# Patient Record
Sex: FEMALE | HISPANIC OR LATINO | URBAN - METROPOLITAN AREA
[De-identification: names, ages, dates, MRNs, and addresses within clinical notes are randomized per-mention and may not be internally consistent; named-entity substitution may affect disease eponyms.]

---

## 2019-04-18 ENCOUNTER — APPOINTMENT (RX ONLY)
Dept: URBAN - METROPOLITAN AREA CLINIC 310 | Facility: CLINIC | Age: 44
Setting detail: DERMATOLOGY
End: 2019-04-18

## 2019-04-18 DIAGNOSIS — L20.89 OTHER ATOPIC DERMATITIS: ICD-10-CM

## 2019-04-18 PROBLEM — D56.9 THALASSEMIA, UNSPECIFIED: Status: ACTIVE | Noted: 2019-04-18

## 2019-04-18 PROBLEM — F41.9 ANXIETY DISORDER, UNSPECIFIED: Status: ACTIVE | Noted: 2019-04-18

## 2019-04-18 PROBLEM — L85.3 XEROSIS CUTIS: Status: ACTIVE | Noted: 2019-04-18

## 2019-04-18 PROBLEM — L29.8 OTHER PRURITUS: Status: ACTIVE | Noted: 2019-04-18

## 2019-04-18 PROBLEM — J30.1 ALLERGIC RHINITIS DUE TO POLLEN: Status: ACTIVE | Noted: 2019-04-18

## 2019-04-18 PROBLEM — R23.3 SPONTANEOUS ECCHYMOSES: Status: ACTIVE | Noted: 2019-04-18

## 2019-04-18 PROBLEM — L20.84 INTRINSIC (ALLERGIC) ECZEMA: Status: ACTIVE | Noted: 2019-04-18

## 2019-04-18 PROCEDURE — ? PRESCRIPTION

## 2019-04-18 PROCEDURE — 99202 OFFICE O/P NEW SF 15 MIN: CPT

## 2019-04-18 RX ORDER — TRIAMCINOLONE ACETONIDE 1 MG/G
OINTMENT TOPICAL
Qty: 1 | Refills: 2 | Status: ERX | COMMUNITY
Start: 2019-04-18

## 2019-04-18 RX ADMIN — TRIAMCINOLONE ACETONIDE: 1 OINTMENT TOPICAL at 16:27

## 2019-04-18 ASSESSMENT — LOCATION ZONE DERM: LOCATION ZONE: TRUNK

## 2019-04-18 ASSESSMENT — LOCATION SIMPLE DESCRIPTION DERM: LOCATION SIMPLE: CHEST

## 2019-04-18 ASSESSMENT — LOCATION DETAILED DESCRIPTION DERM: LOCATION DETAILED: STERNAL NOTCH

## 2019-04-18 NOTE — HPI: RASH
What Type Of Note Output Would You Prefer (Optional)?: Bullet Format
Is This A New Presentation, Or A Follow-Up?: Rash
Additional History: She just finished a course of Prednisone from an ER visit.